# Patient Record
Sex: FEMALE | Race: WHITE
[De-identification: names, ages, dates, MRNs, and addresses within clinical notes are randomized per-mention and may not be internally consistent; named-entity substitution may affect disease eponyms.]

---

## 2017-10-07 ENCOUNTER — HOSPITAL ENCOUNTER (EMERGENCY)
Dept: HOSPITAL 25 - ED | Age: 22
Discharge: HOME | End: 2017-10-07
Payer: COMMERCIAL

## 2017-10-07 VITALS — DIASTOLIC BLOOD PRESSURE: 78 MMHG | SYSTOLIC BLOOD PRESSURE: 117 MMHG

## 2017-10-07 DIAGNOSIS — K13.0: Primary | ICD-10-CM
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
## 2018-01-03 ENCOUNTER — HOSPITAL ENCOUNTER (EMERGENCY)
Dept: HOSPITAL 25 - UCEAST | Age: 23
Discharge: HOME | End: 2018-01-03
Payer: SELF-PAY

## 2018-01-03 VITALS — SYSTOLIC BLOOD PRESSURE: 108 MMHG | DIASTOLIC BLOOD PRESSURE: 68 MMHG

## 2018-01-03 DIAGNOSIS — J32.9: Primary | ICD-10-CM

## 2018-01-03 PROCEDURE — G0463 HOSPITAL OUTPT CLINIC VISIT: HCPCS

## 2018-01-03 PROCEDURE — 99211 OFF/OP EST MAY X REQ PHY/QHP: CPT

## 2018-01-03 NOTE — UC
Throat Pain/Nasal Seven HPI





- HPI Summary


HPI Summary: 





23 y/o white female presents with sinus symptoms for the last 3 weeks. She 

tells me that 3 weeks ago she developed sinus pain/pressure/congestion. She 

gets this around this time every year, but usually it will go away in a week or 

so. This time it has not and seems to be progressively getting worse. She has 

not been taking anything OTC. Denies fever, chills, ST, SOB, chest pain, 

abdominal pain, N/V/D/C





- History of Current Complaint


Chief Complaint: UCRespiratory


Stated Complaint: SINUS PAIN,COLD,COUGH


Time Seen by Provider: 01/03/18 19:33


Hx Obtained From: Patient


Hx Last Menstrual Period: 1/2/2018


Onset/Duration: Gradual Onset


Severity: Moderate


Pain Intensity: 7


Pain Scale Used: 0-10 Numeric





- Allergies/Home Medications


Allergies/Adverse Reactions: 


 Allergies











Allergy/AdvReac Type Severity Reaction Status Date / Time


 


No Known Allergies Allergy   Verified 01/03/18 19:15











Home Medications: 


 Home Medications





Lamotrigine [Lamictal] 100 mg PO BID 01/03/18 [History Confirmed 01/03/18]











PMH/Surg Hx/FS Hx/Imm Hx


Previously Healthy: Yes


Psychological History: Bipolar Disorder





- Surgical History


Surgical History: Yes


Surgery Procedure, Year, and Place: 2 years old eye surgery





- Social History


Occupation: Employed Full-time


Lives: With Family


Alcohol Use: None


Substance Use Type: None


Smoking Status (MU): Never Smoked Tobacco





- Immunization History


Most Recent Influenza Vaccination: 2015





Review of Systems


Constitutional: Negative


Skin: Negative


Eyes: Negative


ENT: Nasal Discharge, Sinus Congestion, Sinus Pain/Tenderness


Respiratory: Negative


Cardiovascular: Negative


Gastrointestinal: Negative


All Other Systems Reviewed And Are Negative: Yes





Physical Exam


Triage Information Reviewed: Yes


Appearance: Well-Appearing, Well-Nourished


Vital Signs: 


 Initial Vital Signs











Temp  98.4 F   01/03/18 19:09


 


Pulse  91   01/03/18 19:09


 


Resp  18   01/03/18 19:09


 


BP  108/68   01/03/18 19:09


 


Pulse Ox  100   01/03/18 19:09











Vital Signs Reviewed: Yes


Eyes: Positive: Conjunctiva Clear.  Negative: Conjunctiva Inflamed, Discharge


ENT: Positive: Hearing grossly normal, Pharynx normal, Nasal congestion, Nasal 

drainage, TM red - Left, Sinus tenderness, Uvula midline.  Negative: Pharyngeal 

erythema, TM bulging, TM dull, Tonsillar swelling, Tonsillar exudate


Neck: Positive: Supple, Nontender, No Lymphadenopathy


Respiratory: Positive: Chest non-tender, Lungs clear, Normal breath sounds, No 

respiratory distress, No accessory muscle use


Cardiovascular: Positive: RRR, No Murmur, Pulses Normal


Neurological: Positive: Alert


Psychological: Positive: Age Appropriate Behavior





Throat Pain/Nasal Course/Dx





- Course


Course Of Treatment: Sinusitis - Amoxicillin





- Differential Dx/Diagnosis


Differential Diagnosis/HQI/PQRI: Influenza, Mononucleosis, Sinusitis, URI


Provider Diagnoses: Sinusitis





Discharge





- Discharge Plan


Condition: Stable


Disposition: HOME


Prescriptions: 


Amoxicillin PO (*) [Amoxicillin 500 MG CAP*] 500 mg PO Q12H #20 cap


Patient Education Materials:  Sinusitis (ED)


Referrals: 


Non Staff,Doctor [Primary Care Provider] - 


Additional Instructions: 


If you develop a fever, shortness of breath, chest pain, new or worsening 

symptoms - please call your PCP or go to the ED.

## 2018-08-18 ENCOUNTER — HOSPITAL ENCOUNTER (EMERGENCY)
Dept: HOSPITAL 25 - UCEAST | Age: 23
Discharge: HOME | End: 2018-08-18
Payer: COMMERCIAL

## 2018-08-18 VITALS — DIASTOLIC BLOOD PRESSURE: 70 MMHG | SYSTOLIC BLOOD PRESSURE: 107 MMHG

## 2018-08-18 DIAGNOSIS — L98.9: Primary | ICD-10-CM

## 2018-08-18 PROCEDURE — 99211 OFF/OP EST MAY X REQ PHY/QHP: CPT

## 2018-08-18 PROCEDURE — G0463 HOSPITAL OUTPT CLINIC VISIT: HCPCS

## 2018-08-18 NOTE — UC
Skin Complaint HPI





- HPI Summary


HPI Summary: 





hx Sturge Webber, has 2 small raised "mole" on L side face (affected side) and 

today started bleeding at work. also has L sided nose bleed last pm





- History of Current Complaint


Chief Complaint: UCSkin


Time Seen by Provider: 08/18/18 13:24


Stated Complaint: NOSE/FACE BLEEDING


Hx Obtained From: Patient


Hx Last Menstrual Period: 8/13/18


Pregnant?: No


Onset/Duration: Sudden Onset


Current Severity: None


Pain Intensity: 0


Location: Face


Aggravating Factor(s): Touch


Alleviating Factor(s): Nothing


Associated Signs & Symptoms: Positive: Negative





- Allergy/Home Medications


Allergies/Adverse Reactions: 


 Allergies











Allergy/AdvReac Type Severity Reaction Status Date / Time


 


No Known Allergies Allergy   Verified 08/18/18 13:22














Review of Systems


Constitutional: Negative


Skin: Other - bleeding lesion L face


ENT: Epistaxis - L nostril last pm-resolved now


Respiratory: Negative


Cardiovascular: Negative


Psychological: Negative


All Other Systems Reviewed And Are Negative: Yes





PMH/Surg Hx/FS Hx/Imm Hx


Previously Healthy: Yes


Neurological History: Seizures





- Surgical History


Surgical History: Yes


Surgery Procedure, Year, and Place: 2 years old eye surgery





- Family History


Known Family History: Positive: None


   Negative: Cardiac Disease, Hypertension





- Social History


Occupation: Employed Part-time


Lives: With Family


Alcohol Use: Rare


Substance Use Type: None


Smoking Status (MU): Never Smoked Tobacco





- Immunization History


Most Recent Influenza Vaccination: 2015





Physical Exam


Triage Information Reviewed: Yes


Appearance: Well-Appearing, No Pain Distress, Well-Nourished


Vital Signs: 


 Initial Vital Signs











Temp  97.8 F   08/18/18 13:19


 


Pulse  68   08/18/18 13:19


 


Resp  16   08/18/18 13:19


 


BP  107/70   08/18/18 13:19


 


Pulse Ox  99   08/18/18 13:19











Vital Signs Reviewed: Yes


ENT: Positive: Normal ENT inspection.  Negative: Nasal drainage


Neck exam: Normal


Respiratory Exam: Normal


Cardiovascular Exam: Normal


Neurological Exam: Normal


Neurological: Positive: Alert


Psychological Exam: Normal


Skin Exam: Other - 2 small (3mm x 3mm) raised lesions L cheek, no active 

bleeding





Course/Dx





- Differential Diagnoses - Skin Complaint


Differential Diagnoses: Other - abscess, abrasion





- Diagnoses


Provider Diagnoses: skin lesion





Discharge





- Sign-Out/Discharge


Documenting (check all that apply): Patient Departure





- Discharge Plan


Condition: Good


Disposition: HOME


Patient Education Materials:  Nosebleed (ED)


Referrals: 


No Primary Care Phys,NOPCP [Primary Care Provider] - 


Roberto Poole MD [Medical Doctor] - 1 Week (to follow nose bleeds)


Additional Instructions: 


follow-up with dermatologist to have facial lesions examined


Follow-up with Dr. Poole for nose bleeds








- Billing Disposition and Condition


Condition: GOOD


Disposition: Home





Attestation Statement


User Type: Provider - I was available for consult. This patient was seen by the 

ANDREW. The patient was not presented to, seen by, or examined by me. -Ljmarley





Addendum entered and electronically signed by Robles Villarreal NP  08/18/18 13:

47:

## 2019-02-08 ENCOUNTER — HOSPITAL ENCOUNTER (EMERGENCY)
Dept: HOSPITAL 25 - ED | Age: 24
Discharge: HOME | End: 2019-02-08
Payer: COMMERCIAL

## 2019-02-08 VITALS — DIASTOLIC BLOOD PRESSURE: 62 MMHG | SYSTOLIC BLOOD PRESSURE: 114 MMHG

## 2019-02-08 DIAGNOSIS — R10.2: ICD-10-CM

## 2019-02-08 DIAGNOSIS — N73.9: Primary | ICD-10-CM

## 2019-02-08 DIAGNOSIS — Z32.02: ICD-10-CM

## 2019-02-08 LAB
ANION GAP SERPL CALC-SCNC: 5 MMOL/L (ref 2–11)
BASOPHILS # BLD AUTO: 0.1 10^3/UL (ref 0–0.2)
BUN SERPL-MCNC: 16 MG/DL (ref 6–24)
BUN/CREAT SERPL: 18.6 (ref 8–20)
CALCIUM SERPL-MCNC: 9.6 MG/DL (ref 8.6–10.3)
CHLORIDE SERPL-SCNC: 107 MMOL/L (ref 101–111)
EOSINOPHIL # BLD AUTO: 0.1 10^3/UL (ref 0–0.6)
GLUCOSE SERPL-MCNC: 90 MG/DL (ref 70–100)
HCG SERPL QL: < 0.6 MIU/ML
HCO3 SERPL-SCNC: 26 MMOL/L (ref 22–32)
HCT VFR BLD AUTO: 42 % (ref 35–47)
HGB BLD-MCNC: 13.9 G/DL (ref 12–16)
LYMPHOCYTES # BLD AUTO: 1.9 10^3/UL (ref 1–4.8)
MCH RBC QN AUTO: 30 PG (ref 27–31)
MCHC RBC AUTO-ENTMCNC: 34 G/DL (ref 31–36)
MCV RBC AUTO: 89 FL (ref 80–97)
MONOCYTES # BLD AUTO: 0.3 10^3/UL (ref 0–0.8)
NEUTROPHILS # BLD AUTO: 2.1 10^3/UL (ref 1.5–7.7)
NRBC # BLD AUTO: 0 10^3/UL
NRBC BLD QL AUTO: 0.1
PLATELET # BLD AUTO: 170 10^3/UL (ref 150–450)
POTASSIUM SERPL-SCNC: 4.7 MMOL/L (ref 3.5–5)
RBC # BLD AUTO: 4.65 10^6/UL (ref 4–5.4)
SODIUM SERPL-SCNC: 138 MMOL/L (ref 135–145)
WBC # BLD AUTO: 4.4 10^3/UL (ref 3.5–10.8)

## 2019-02-08 PROCEDURE — 87491 CHLMYD TRACH DNA AMP PROBE: CPT

## 2019-02-08 PROCEDURE — 84702 CHORIONIC GONADOTROPIN TEST: CPT

## 2019-02-08 PROCEDURE — 36415 COLL VENOUS BLD VENIPUNCTURE: CPT

## 2019-02-08 PROCEDURE — 87591 N.GONORRHOEAE DNA AMP PROB: CPT

## 2019-02-08 PROCEDURE — 80048 BASIC METABOLIC PNL TOTAL CA: CPT

## 2019-02-08 PROCEDURE — 96372 THER/PROPH/DIAG INJ SC/IM: CPT

## 2019-02-08 PROCEDURE — 87661 TRICHOMONAS VAGINALIS AMPLIF: CPT

## 2019-02-08 PROCEDURE — 85025 COMPLETE CBC W/AUTO DIFF WBC: CPT

## 2019-02-08 PROCEDURE — 99282 EMERGENCY DEPT VISIT SF MDM: CPT

## 2019-02-08 PROCEDURE — 81003 URINALYSIS AUTO W/O SCOPE: CPT

## 2019-02-08 NOTE — ED
GI/ HPI





- HPI Summary


HPI Summary: 


Patient is a 23-year-old female who presents emergency department for vaginal 

pain that started early this morning.  Patient states she woke up around 3 AM 

with sharp vaginal pain.  She took a dose of ibuprofen with no improvement.  

Patient notes pain is worse with walking and sitting.  She denies vaginal 

discharge or irregular bleeding.  Patient denies fever, chills, nausea, 

vomiting.  Patient did note discomfort with urination. Symptoms are mild in 

severity. No current modifying factors.








- History of Current Complaint


Chief Complaint: EDGeneral


Time Seen by Provider: 02/08/19 06:48


Stated Complaint: GENERAL


Hx Obtained From: Patient


Hx Last Menstrual Period: 8/13/18


Pain Intensity: 7





- Allergy/Home Medications


Allergies/Adverse Reactions: 


 Allergies











Allergy/AdvReac Type Severity Reaction Status Date / Time


 


No Known Allergies Allergy   Verified 08/18/18 13:22














PMH/Surg Hx/FS Hx/Imm Hx


Previously Healthy: Yes





- Surgical History


Surgery Procedure, Year, and Place: 2 years old eye surgery


Infectious Disease History: No


Infectious Disease History: 


   Denies: Traveled Outside the [place] in Last 30 Days





- Family History


Known Family History: Positive: None, Non-Contributory


   Negative: Cardiac Disease, Hypertension





- Social History


Occupation: Employed Full-time


Lives: With Family


Alcohol Use: Rare


Substance Use Type: Reports: None


Smoking Status (MU): Never Smoked Tobacco





Review of Systems


Constitutional: Negative


Negative: Fever, Chills


ENT: Negative


Cardiovascular: Negative


Respiratory: Negative


Positive: Abdominal Pain.  Negative: Vomiting, Diarrhea, Nausea


Genitourinary: Negative


Negative: discharge, frequency, flank pain, hematuria


Skin: Negative


Neurological: Negative


All Other Systems Reviewed And Are Negative: Yes





Physical Exam


Triage Information Reviewed: Yes


Vital Signs On Initial Exam: 


 Initial Vitals











Temp Pulse Resp BP Pulse Ox


 


 97.3 F   80   16   133/103   99 


 


 02/08/19 06:44  02/08/19 06:44  02/08/19 06:44  02/08/19 06:44  02/08/19 06:44











Vital Signs Reviewed: Yes


Appearance: Positive: Well-Appearing - Pt. lying in bed in NAD. Family present.


Skin: Positive: Warm, Dry


Head/Face: Positive: Normal Head/Face Inspection


Eyes: Positive: Normal, EOMI


Neck: Positive: Supple


Respiratory/Lung Sounds: Positive: Clear to Auscultation, Breath Sounds Present


Cardiovascular: Positive: Normal, RRR


Abdomen Description: Positive: Nontender, Soft


Pelvic Exam: Positive: Other - Exam performed with CANDACE Barnes. External 

genitalia unremarkable other than small amount of whitish matterial to inner 

labia. Speculum exame reveals a moderate amount of yellowish discharge from 

cervix. Cervix is erythematous. CMT positive and pain to left adnexa. Cultures 

obtained.


Neurological: Positive: Normal, CN Intact II-III


Psychiatric: Positive: Affect/Mood Appropriate





Diagnostics





- Vital Signs


 Vital Signs











  Temp Pulse Resp BP Pulse Ox


 


 02/08/19 06:44  97.3 F  80  16  133/103  99














- Laboratory


Result Diagrams: 


 02/08/19 07:35





 02/08/19 07:35


Lab Statement: Any lab studies that have been ordered have been reviewed, and 

results considered in the medical decision making process.





GIGU Course/Dx





- Course


Course Of Treatment: Pt. presenting c/o vaginal pain. She is afebrile and well 

appearing. Abd. exam is benign. Labs are unremarkable including negative urine 

and pregnancy. Pelvic exam concerning for PID. Cultures obtained. Will treat 

with rocephin and a course doxycycline. Pt. to call her GYN today for close 

f.u. Naproxen rx for pain. To return to ER for increased pain, fever, vomiting 

or if concerned. Pt. understands and agrees with plan.





- Diagnoses


Differential Diagnoses - Female: Appendicitis, Bartholin Cyst, Pregnancy, 

Pelvic Inflammatory Disease, STD, Tubo-Ovarian Abscess, Urinary Tract Infection


Provider Diagnoses: 


 PID (acute pelvic inflammatory disease), Pelvic pain








Discharge





- Sign-Out/Discharge


Documenting (check all that apply): Patient Departure


Patient Received Moderate/Deep Sedation with Procedure: No





- Discharge Plan


Condition: Good


Disposition: HOME


Prescriptions: 


DOXYcycline CAP(*) [DOXYcycline 100MG CAP(*)] 100 mg PO BID #20 cap


Naproxen [Naproxen 500 mg tab] 500 mg PO BID #20 tablet


Patient Education Materials:  Pelvic Inflammatory Disease (ED), Pelvic Pain in 

Women (ED)


Referrals: 


Debra Mcmanus MD [Medical Doctor] - 


Additional Instructions: 


Call your GYN today to schedule a close follow up appointment in 1-2 days


Take medication as directed


Can apply warm compresses to help with pain


Avoid sexual activity until symptoms resolve and antibiotics are finished


Return to ER for increased pain, fever, vomiting or if concerned





- Billing Disposition and Condition


Condition: GOOD


Disposition: Home

## 2019-02-12 ENCOUNTER — HOSPITAL ENCOUNTER (EMERGENCY)
Dept: HOSPITAL 25 - ED | Age: 24
Discharge: HOME | End: 2019-02-12
Payer: COMMERCIAL

## 2019-02-12 VITALS — SYSTOLIC BLOOD PRESSURE: 124 MMHG | DIASTOLIC BLOOD PRESSURE: 92 MMHG

## 2019-02-12 DIAGNOSIS — R10.2: Primary | ICD-10-CM

## 2019-02-12 DIAGNOSIS — M54.9: ICD-10-CM

## 2019-02-12 DIAGNOSIS — R10.9: ICD-10-CM

## 2019-02-12 PROCEDURE — 99282 EMERGENCY DEPT VISIT SF MDM: CPT

## 2019-02-12 PROCEDURE — 76830 TRANSVAGINAL US NON-OB: CPT

## 2019-02-12 NOTE — ED
Abdominal Pain/Female





- HPI Summary


HPI Summary: 


Patient is a 23-year-old  female presenting to the ED with 6/10 suprapubic 

pain that began about 4 hours ago. Patient was seen 19 for the same 

complaint, was placed on doxycycline and rocephin and was prescribed naproxen 

for pain management. Patient has been compliant with medication, but did not 

take naproxen yesterday. She states that she did not experience the pain the 

past few days, but woke with it this morning. She describes the pain as sharp/

stabbing. Previously the pain was aggravated by movement and now is now more 

constant. When the pain is intense, it tends to radiate toward the back. 

Patient denies any discharge, itching, burining, or changes in sexual partners. 

Denies changes in urination or bowels. Denies fevers, chills, N/V/D. Patient 

notes a new nexplanon implant was inserted 19 and has not had a period this 

month. LMP the week of 19. States periods are occasionally irregular. 

Denies hx of ovarian cysts, PCOS, endometriosis. 





- History of Current Complaint


Chief Complaint: EDOBProblems


Stated Complaint: GENERAL ILLNESS


Time Seen by Provider: 19 07:42


Hx Obtained From: Patient


Hx Last Menstrual Period: 18


Onset/Duration: Sudden Onset, Still Present


Timing: Constant


Severity Initially: Moderate


Severity Currently: Moderate


Pain Intensity: 6


Pain Scale Used: 0-10 Numeric


Location: Suprapubic


Radiates: Yes


Radiates to: Back - lower


Character: Sharp


Alleviating Factor(s): Nothing, OTC Analgesics


Associated Signs and Symptoms: Positive: Back Pain.  Negative: Fever, 

Constipation, Urinary Symptoms, Decreased Appetite, Vaginal Bleeding, Vaginal 

Discharge, Nausea, Vomiting, Diarrhea


Allergies/Adverse Reactions: 


 Allergies











Allergy/AdvReac Type Severity Reaction Status Date / Time


 


No Known Allergies Allergy   Verified 19 07:34











Home Medications: 


 Home Medications





Acetaminophen [Tylenol Extra Strength] 500 mg PO Q8HR PRN 19 [History 

Confirmed 19]


Etonogestrel [Nexplanon] 1 implant .ROUTE ONCE 19 [History Confirmed ]











PMH/Surg Hx/FS Hx/Imm Hx


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 


Endocrine/Hematology History: 


   Denies: Hx Diabetes, Hx Thyroid Disease, Hx Anemia


 History: 


   Denies: Hx Kidney Infection, Hx Kidney Stones


Sensory History: Reports: Hx Glaucoma


Neurological History: Reports: Hx Seizures





- Surgical History


Surgery Procedure, Year, and Place: 2 years old eye surgery


Infectious Disease History: No


Infectious Disease History: 


   Denies: Traveled Outside the [place] in Last 30 Days





- Family History


Known Family History: Positive: None, Non-Contributory


   Negative: Cardiac Disease, Hypertension





- Social History


Lives: With Family


Alcohol Use: Rare


Substance Use Type: Reports: None


Smoking Status (MU): Never Smoked Tobacco





Review of Systems


Negative: Fever, Chills, Fatigue


Eyes: Negative


ENT: Negative


Cardiovascular: Negative


Negative: Chest Pain


Respiratory: Negative


Negative: Shortness Of Breath, Cough


Positive: Abdominal Pain - suprapubic .  Negative: Vomiting, Diarrhea, Nausea


Positive: no symptoms reported.  Negative: burning, dysuria, frequency, flank 

pain, hematuria, pain


Musculoskeletal: Negative


Skin: Negative


Neurological: Negative


Negative: Headache


Psychological: Normal


All Other Systems Reviewed And Are Negative: Yes





Physical Exam


Triage Information Reviewed: Yes


Vital Signs On Initial Exam: 


 Initial Vitals











Temp Pulse Resp BP Pulse Ox


 


 97.1 F   80   16   127/88   99 


 


 19 07:32  19 07:32  19 07:32  19 07:32  19 07:32











Vital Signs Reviewed: Yes


Appearance: Positive: Well-Appearing, Well-Nourished, Pain Distress - 6/10


Skin: Positive: Warm, Dry, Other - [place] marking to the left side of face


Head/Face: Positive: Normal Head/Face Inspection


Eyes: Positive: Normal, EOMI - esotropia noted in left eye, NORBERT


ENT: Positive: Normal ENT inspection


Neck: Positive: Supple


Respiratory/Lung Sounds: Positive: Clear to Auscultation, Breath Sounds Present


Cardiovascular: Positive: Normal, RRR


Abdomen Description: Positive: Nontender, Soft


Bowel Sounds: Positive: Present


Pelvic Exam: Positive: External Exam Normal, Speculum Exam Normal, Bimanual 

Exam Normal, No Cerv. Motion Tender, No Masses, Discharge - Scant white 

discharge.  Negative: Active Bleeding


Musculoskeletal: Positive: Normal, Strength/ROM Intact


Neurological: Positive: Normal, Sensory/Motor Intact, Alert, Oriented to Person 

Place, Time, CN Intact II-III


Psychiatric: Positive: Normal


AVPU Assessment: Alert





Diagnostics





- Vital Signs


 Vital Signs











  Temp Pulse Resp BP Pulse Ox


 


 19 07:32  97.1 F  80  16  127/88  99














- Laboratory


Lab Statement: Any lab studies that have been ordered have been reviewed, and 

results considered in the medical decision making process.





Abdominal Pain Fem Course/Dx





- Course


Course Of Treatment: Nurse's notes reviewed. Patient presents with constant 6/

10 suprapubic pain that started this morning. Patient was seen  for this 

issue and placed on ceftriaxone/doxycycline. Vaginal cultures and urinalysis 

were negative. Today, patient denies discharge, changes in urination, or change 

in sex partners. Denies fever, N/V/D. Denies hx of ovarian cysts. New nexplanon 

was inserted 18. LMP week of 19. On clinical exam abdomen is soft with 

mild tenderness mid-suprapubic region. External genitalia normal. There is 

scant white, non-odorous discharge in vaginal canal. No cervical motion 

tenderness. No palpable masses or tenderness on bimanual exam. Transvaginal 

ultrasound reveals no acute findings, but there is small amount of fluid in the 

cul de sca. While discussing these findings with the patient, she notes pain 

has resolved. Likely patient had ruptured ovarian cyst. Advised follow-up 

appointemnt with GYN today, patient understands and states she has appointment 

on , but will schedule sooner as needed. Continue antibiotics. Continue 

naproxen or otc pain medication as needed. Work note provided for today. Return 

to ed if experience new or worsening symptoms.





- Diagnoses


Differential Diagnosis: Positive: Ovarian Cyst, Pelvic Inflammatory Disease, 

Urinary Tract Infection


Provider Diagnoses: 


 Pelvic pain in female








Discharge





- Sign-Out/Discharge


Documenting (check all that apply): Patient Departure


Patient Received Moderate/Deep Sedation with Procedure: No





- Discharge Plan


Condition: Improved


Disposition: HOME


Patient Education Materials:  Pelvic Pain in Women (ED)


Forms:  *Work Release


Referrals: 


Debra Mcmanus MD [Medical Doctor] - 


Additional Instructions: 


Gynecologist today to schedule follow-up.  Continue antibiotics.  Continue 

Naprosyn for discomfort.  Stay well-hydrated.  Return with blood in the urine, 

increased discomfort, urinary symptoms, worse, new symptoms or other concerns.  

He symptoms may be resulting from recent change of your implanted birth 

control. 





- Billing Disposition and Condition


Condition: IMPROVED


Disposition: Home





- Attestation Statements


Document Initiated by Scribe: No

## 2020-01-03 ENCOUNTER — HOSPITAL ENCOUNTER (EMERGENCY)
Dept: HOSPITAL 25 - ED | Age: 25
Discharge: HOME | End: 2020-01-03
Payer: SELF-PAY

## 2020-01-03 VITALS — DIASTOLIC BLOOD PRESSURE: 58 MMHG | SYSTOLIC BLOOD PRESSURE: 108 MMHG

## 2020-01-03 DIAGNOSIS — Z79.899: ICD-10-CM

## 2020-01-03 DIAGNOSIS — H10.9: Primary | ICD-10-CM

## 2020-01-03 PROCEDURE — 99282 EMERGENCY DEPT VISIT SF MDM: CPT

## 2020-01-03 NOTE — ED
Throat Pain/Nasal Congestion





- HPI Summary


HPI Summary: 


This patient is a 24 year old female presenting to [facility] with a chief complaint 

of left eye discomfort, drainage, and pruritic. She states she noted it was 

draining last night but has become swollen and red since waking up this 

morning. 


She states she has laser surgery on the eye when she was younger. 





- History of Current Complaint


Chief Complaint: EDEyeProblem


Time Seen by Provider: 01/03/20 02:47


Hx Obtained From: Patient


Onset/Duration: Lasting Days





- Allergies/Home Medications


Allergies/Adverse Reactions: 


 Allergies











Allergy/AdvReac Type Severity Reaction Status Date / Time


 


No Known Allergies Allergy   Verified 01/03/20 02:47














PMH/Surg Hx/FS Hx/Imm Hx


Endocrine/Hematology History: 


   Denies: Hx Diabetes, Hx Thyroid Disease, Hx Anemia


 History: 


   Denies: Hx Kidney Infection, Hx Kidney Stones


Sensory History: Reports: Hx Glaucoma


Opthamlomology History: Reports: Hx Glaucoma


Neurological History: Reports: Hx Seizures





- Surgical History


Surgery Procedure, Year, and Place: 2 years old eye surgery


Infectious Disease History: No


Infectious Disease History: 


   Denies: Traveled Outside the [place] in Last 30 Days





- Family History


Known Family History: Positive: None, Non-Contributory


   Negative: Cardiac Disease, Hypertension





- Social History


Alcohol Use: Rare


Substance Use Type: Reports: None


Smoking Status (MU): Never Smoked Tobacco





Review of Systems


Negative: Fever


Positive: Drainage, Erythema, Other - Swelling around the eye, discomfort, 

pruritus 


All Other Systems Reviewed And Are Negative: Yes





Physical Exam





- Summary


Physical Exam Summary: 





General: Well-developed, Well-nourished FEMALE. No acute distress.


HEENT: Normocephalic, Atraumatic. 


              Eyes: Conjuctiva normal, PERRL. Discordant gaze. Pupils are not 

equal, are reactive, conjuctiva on the left is ejected, purulent drainage. No 

foreign bodies or corneal abrasion noted. EOMI. Fundoscopic exam shows no papal 

edema bilaterally. 


              Oropharynx: Clear, mucous membranes moist, (-) exudates. 


Neck: Soft, FROM, (-) lymphadenopathy, (-) thyromegaly, (-) JVD.


Cardiovascular: Normal sinus rhythm, (-) murmur.


Lungs: Clear to auscultation bilaterally (-) wheezes, (-) rales, (-) rhonchi.


Abdomen: Soft, non-tender, non-distended, (-) organomegaly, normal bowel sounds.


Back: (-) CVA tenderness


Extremities: No edema.


Skin: Warm, dry, (-) rash.


Neuro: Alert and oriented x3, no focal deficits.


Psychiatric: Mood normal, affect normal.





Triage Information Reviewed: Yes


Vital Signs On Initial Exam: 


 Initial Vitals











Temp Pulse Resp BP Pulse Ox


 


 98.4 F   63   16   116/80   98 


 


 01/03/20 02:45  01/03/20 02:45  01/03/20 02:45  01/03/20 02:45  01/03/20 02:45











Vital Signs Reviewed: Yes





Procedures





- Sedation


Patient Received Moderate/Deep Sedation with Procedure: No





Diagnostics





- Vital Signs


 Vital Signs











  Temp Pulse Resp BP Pulse Ox


 


 01/03/20 02:45  98.4 F  63  16  116/80  98














- Laboratory


Lab Statement: Any lab studies that have been ordered have been reviewed, and 

results considered in the medical decision making process.





EENT Course/Dx





- Course


Course Of Treatment: 24 year old female presents with painful red eye. she 

states yesterday her left eye was red. she awoke tonight with discomfort, 

irritation in eye. she could barely open eye due to thick purulent drainage. no 

fever, no change in vision. no known foreign body. exam demonstrated injected 

conjunctiva. no foreign body or corneal abrasion. started on erythromycin 

ointment and given ibuprofen for discomfort. follow up [facility] eye  follow up 

sooner for any worsening symptoms





- Diagnoses


Provider Diagnoses: 


 Conjunctivitis, left eye








Discharge ED





- Sign-Out/Discharge


Documenting (check all that apply): Patient Departure - Discharge





- Discharge Plan


Condition: Stable


Disposition: HOME


Patient Education Materials:  Conjunctivitis (ED)


Referrals: 


No Primary Care Phys,NOPCP [Primary Care Provider] - 


Additional Instructions: 


Return to ED with new or worsening symptoms. 





- Billing Disposition and Condition


Condition: STABLE


Disposition: Home





- Attestation Statements


Document Initiated by Scribe: Yes


Documenting Scribe: David Bonilla


Provider For Whom Scribe is Documenting (Include Credential): Susan Olson MD


Scribe Attestation: 


David RENNER, scribed for Susan Olson MD on 01/03/20 at 0601. 


Scribe Documentation Reviewed: Yes


Provider Attestation: 


The documentation as recorded by the scribe, David Bonilla accurately 

reflects the service I personally performed and the decisions made by me, 

Susan Olson MD


Status of Jason Document: Viewed